# Patient Record
Sex: MALE | Race: WHITE | NOT HISPANIC OR LATINO | Employment: UNEMPLOYED | URBAN - METROPOLITAN AREA
[De-identification: names, ages, dates, MRNs, and addresses within clinical notes are randomized per-mention and may not be internally consistent; named-entity substitution may affect disease eponyms.]

---

## 2018-02-05 ENCOUNTER — TRANSFERRED RECORDS (OUTPATIENT)
Dept: HEALTH INFORMATION MANAGEMENT | Facility: CLINIC | Age: 23
End: 2018-02-05

## 2018-02-07 ENCOUNTER — MEDICAL CORRESPONDENCE (OUTPATIENT)
Dept: HEALTH INFORMATION MANAGEMENT | Facility: CLINIC | Age: 23
End: 2018-02-07

## 2018-02-07 ENCOUNTER — TRANSFERRED RECORDS (OUTPATIENT)
Dept: HEALTH INFORMATION MANAGEMENT | Facility: CLINIC | Age: 23
End: 2018-02-07

## 2018-03-01 ENCOUNTER — PRE VISIT (OUTPATIENT)
Dept: UROLOGY | Facility: CLINIC | Age: 23
End: 2018-03-01

## 2018-03-01 NOTE — TELEPHONE ENCOUNTER
MEDICAL RECORDS REQUEST   [facility]  Urology Clinic  [address] [place] [address]  PHONE: [phone]  Fax: [phone]        FUTURE VISIT INFORMATION                                                   Lucas Blank, : 1995 scheduled for future visit at [facility]    APPOINTMENT INFORMATION:    Date: 2018    Provider:  RIRI GALEANA    Reason for Visit/Diagnosis: HEMATURIA    REFERRAL INFORMATION:    Referring provider:  OMAR WESTFALL    Specialty: PCP    Referring providers clinic:  [facility] contact number:  [phone]    RECORDS REQUESTED FOR VISIT                                                     NOTES  STATUS/DETAILS   OFFICE NOTE from referring provider  yes   OFFICE NOTE from other specialist  yes   DISCHARGE SUMMARY from hospital  no   DISCHARGE REPORT from the ER  no   OPERATIVE REPORT  no   MEDICATION LIST  yes   LABS     URINALYSIS (UA)  yes   URINE CYTOLOGY  no   DIAGNOSIS SPECIFIC LABS     BLOOD IN URINE  REQUEST SENT TO [facility]  RECORDS IN EPIC  yes          PRE-VISIT CHECKLIST      Record collection complete Yes   Appointment appropriately scheduled           (right time/right provider) Yes   Joint diagnostic appointment coordinated correctly          (ensure right order & amount of time) Yes   MyChart activation If no, please explain IN PROCESS   Questionnaire complete If no, please explain IN PROCESS     Completed by: Dagmar Houston

## 2018-03-06 ENCOUNTER — PRE VISIT (OUTPATIENT)
Dept: UROLOGY | Facility: CLINIC | Age: 23
End: 2018-03-06

## 2018-03-06 NOTE — TELEPHONE ENCOUNTER
New patient consult for hematuria.  Records from [facility] scanned into [facility].

## 2018-03-07 ENCOUNTER — OFFICE VISIT (OUTPATIENT)
Dept: UROLOGY | Facility: CLINIC | Age: 23
End: 2018-03-07
Payer: COMMERCIAL

## 2018-03-07 VITALS
HEIGHT: 69 IN | SYSTOLIC BLOOD PRESSURE: 112 MMHG | BODY MASS INDEX: 22.96 KG/M2 | WEIGHT: 155 LBS | HEART RATE: 54 BPM | DIASTOLIC BLOOD PRESSURE: 63 MMHG

## 2018-03-07 DIAGNOSIS — R31.29 MICROSCOPIC HEMATURIA: Primary | ICD-10-CM

## 2018-03-07 LAB
ALBUMIN UR-MCNC: NEGATIVE MG/DL
APPEARANCE UR: CLEAR
BILIRUB UR QL STRIP: NEGATIVE
COLOR UR AUTO: YELLOW
GLUCOSE UR STRIP-MCNC: NEGATIVE MG/DL
HGB UR QL STRIP: ABNORMAL
KETONES UR STRIP-MCNC: NEGATIVE MG/DL
LEUKOCYTE ESTERASE UR QL STRIP: NEGATIVE
MUCOUS THREADS #/AREA URNS LPF: PRESENT /LPF
NITRATE UR QL: NEGATIVE
PH UR STRIP: 6 PH (ref 5–7)
RBC #/AREA URNS AUTO: 4 /HPF (ref 0–2)
SOURCE: ABNORMAL
SP GR UR STRIP: 1.02 (ref 1–1.03)
UROBILINOGEN UR STRIP-MCNC: 0 MG/DL (ref 0–2)
WBC #/AREA URNS AUTO: <1 /HPF (ref 0–5)

## 2018-03-07 ASSESSMENT — ENCOUNTER SYMPTOMS
SINUS PAIN: 1
CHILLS: 1
BLOOD IN STOOL: 0
HYPERTENSION: 0
WEIGHT LOSS: 0
HEMOPTYSIS: 0
DECREASED CONCENTRATION: 0
HYPOTENSION: 0
HEMATURIA: 1
SYNCOPE: 0
POLYPHAGIA: 0
WHEEZING: 0
MEMORY LOSS: 0
DIARRHEA: 1
HEADACHES: 1
TINGLING: 0
SHORTNESS OF BREATH: 1
SINUS CONGESTION: 1
HOARSE VOICE: 1
NECK MASS: 0
COUGH DISTURBING SLEEP: 1
LEG PAIN: 0
NAUSEA: 1
JAUNDICE: 0
POLYDIPSIA: 0
DYSPNEA ON EXERTION: 0
MUSCLE WEAKNESS: 0
BACK PAIN: 1
NERVOUS/ANXIOUS: 1
NUMBNESS: 0
TROUBLE SWALLOWING: 1
BLOATING: 1
CONSTIPATION: 0
ABDOMINAL PAIN: 1
JOINT SWELLING: 0
SORE THROAT: 1
TREMORS: 0
MYALGIAS: 1
ORTHOPNEA: 1
RECTAL PAIN: 0
DEPRESSION: 0
FATIGUE: 1
WEAKNESS: 0
PALPITATIONS: 0
ALTERED TEMPERATURE REGULATION: 0
LOSS OF CONSCIOUSNESS: 0
POSTURAL DYSPNEA: 1
NIGHT SWEATS: 0
SEIZURES: 0
SNORES LOUDLY: 0
BOWEL INCONTINENCE: 0
COUGH: 1
HEARTBURN: 1
STIFFNESS: 0
ARTHRALGIAS: 0
SPUTUM PRODUCTION: 0
FLANK PAIN: 1
VOMITING: 0
SMELL DISTURBANCE: 0
DISTURBANCES IN COORDINATION: 0
MUSCLE CRAMPS: 1
PARALYSIS: 0
DIZZINESS: 0
TASTE DISTURBANCE: 0
WEIGHT GAIN: 0
DIFFICULTY URINATING: 0
EXERCISE INTOLERANCE: 0
SPEECH CHANGE: 0
INCREASED ENERGY: 1
LIGHT-HEADEDNESS: 1
INSOMNIA: 0
DYSURIA: 0
HALLUCINATIONS: 0
DECREASED APPETITE: 1
PANIC: 0
SLEEP DISTURBANCES DUE TO BREATHING: 0
NECK PAIN: 1
FEVER: 1

## 2018-03-07 ASSESSMENT — PAIN SCALES - GENERAL: PAINLEVEL: NO PAIN (0)

## 2018-03-07 NOTE — LETTER
"3/7/2018       RE: Lucas Blank  [address] [facility] [address]     Dear Colleague,    Thank you for referring your patient, Lucas Blank, to the [facility] UROLOGY AND INST FOR PROSTATE AND UROLOGIC CANCERS at [facility]. Please see a copy of my visit note below.    It was my pleasure to meet Mr. Lucas Blank, a 22 year old year old male seen in consultation today at the request of MD Jose Liao for chief complaint: Consult (New patient consult for hematuria)    HPI: Mr. Lucas Blank is a healthy 22M seeking a job as a  who recently underwent a DOT physical revealing microhematuria. He was given the license from the DOT but he was told to follow up for the hematuria.   No gross hematuria  No stones/ UTIs/ STDs  No problems urinating  His dad has blood in his urine - this was evaluated and he was diagnosed with non-specific urethritis.    Has a midterm today at 6pm.     REVIEW OF DIAGNOSTICS:  2/5/18 - UA - dipstick trace intact blood, RBCs 0-2, otherwise negative  2/7/18- GC/CT negative  2/7/18 - UCx no growth    History reviewed. No pertinent past medical history.    History reviewed. No pertinent surgical history.    FAMILY HISTORY: Denies family history of urologic cancer.   Grandma - breast cancer  Grandpa - lung cancer  Uncle paternal - prostate cancer    SOCIAL HISTORY: Student - Engineering student   reports that he has been smoking.  He uses smokeless tobacco.    No current outpatient prescriptions on file.       ALLERGIES: Review of patient's allergies indicates no known allergies.      REVIEW OF SYSTEMS:    GENERAL PHYSICAL EXAM:   Vitals: /63  Pulse 54  Ht 1.746 m (5' 8.75\")  Wt 70.3 kg (155 lb)  BMI 23.06 kg/m2  Body mass index is 23.06 kg/(m^2).    GENERAL: Well groomed, well developed, well nourished male in NAD.  MS: Gait normal, normal muscle tone  SKIN: Warm to touch, dry.  No visible rashes or lesions on examined " areas.  HEMATOLOGIC/LYMPHATIC/IMMUNOLOGIC: normal inguinal nodes. No LE edema.  NEURO: Alert and oriented x 3.  PSYCH: Normal mood and affect, pleasant and agreeable during interview and exam.     :      Inguinal: no hernias       Circumcised penis, no penile plaques or lesions. Orthotopic location of the urethral meatus.       Scrotum normal.      Testicles of normal firmness and consistency, no masses.      Epididymes bilaterally without masses or tenderness.       ANDIE:      Deferred    RADIOLOGY: The following tests were reviewed: None    LABS: The last test results for Ms. Lucas Blank were reviewed.   PSA - No results found for: PSA  BMP - No lab results found.    CBC - No lab results found.    ASSESSMENT:   1) Dipstick hematuria - no RBCs    PLAN:   - UA with micro reflex today (to confirm no hematuria)  - UA in 1 week or so (again to confirm no hematuria)   - If these are negative then no further workup required      Again, thank you for allowing me to participate in the care of your patient.      Sincerely,    MILTON Langston

## 2018-03-07 NOTE — MR AVS SNAPSHOT
"              After Visit Summary   3/7/2018    Lucas Blank    MRN: [id number]           Patient Information     Date Of Birth          1995        Visit Information        Provider Department      3/7/2018 4:30 PM Mirtha Brunner PA [facility] Urology and [facility] for Prostate and Urologic Cancers        Care Instructions    Will send urine today   Please submit another urine sample over the next couple weeks at the [facility] (this [facility]) on the first floor.     Yancy Brunner          Follow-ups after your visit        Who to contact     Please call your clinic at [phone] to:    Ask questions about your health    Make or cancel appointments    Discuss your medicines    Learn about your test results    Speak to your doctor            Additional Information About Your Visit        MyChart Information     [employer] is an electronic gateway that provides easy, online access to your medical records. With [employer], you can request a clinic appointment, read your test results, renew a prescription or communicate with your care team.     To sign up for [email or web address] visit the website at [email or web address]   You will be asked to enter the access code listed below, as well as some personal information. Please follow the directions to create your username and password.     Your access code is: [email or web address]  Expires: 2018  6:30 AM     Your access code will  in 90 days. If you need help or a new code, please contact your [facility] or call [phone] for assistance.        Care EveryWhere ID     This is your Care EveryWhere ID. This could be used by other organizations to access your [facility] medical records  [id number]        Your Vitals Were     Pulse Height BMI (Body Mass Index)             54 1.746 m (5' 8.75\") 23.06 kg/m2          Blood Pressure from Last 3 Encounters:   18 112/63    Weight from Last 3 Encounters:   18 70.3 kg (155 lb)              Today, you " had the following     No orders found for display       Primary Care Provider Fax #    [facility] [phone]       [address] [place] [id number]        Equal Access to Services     KISHA COATES : Fabián Borden, debra aquino, scott beaulieumafrancisca cruz, savannah stahl angelcecilia sheldonirajramesh chauhan. So [facility] [phone].    ATENCIÓN: Si habla español, tiene a renner disposición servicios gratuitos de asistencia lingüística. Llame al [phone].    We comply with applicable federal civil rights laws and [place] laws. We do not discriminate on the basis of race, color, national origin, age, disability, sex, sexual orientation, or gender identity.            Thank you!     Thank you for choosing [facility] UROLOGY AND [facility] FOR PROSTATE AND UROLOGIC CANCERS  for your care. Our goal is always to provide you with excellent care. Hearing back from our patients is one way we can continue to improve our services. Please take a few minutes to complete the written survey that you may receive in the mail after your visit with us. Thank you!             Your Updated Medication List - Protect others around you: Learn how to safely use, store and throw away your medicines at www.disposemymeds.org.      Notice  As of 3/7/2018  5:17 PM    You have not been prescribed any medications.

## 2018-03-07 NOTE — LETTER
Lucas Blank   [address] [facility] [address]        Dear Lucas:     I am writing you concerning your recent test results:    Results for orders placed or performed in visit on 03/07/18   UA with Microscopic reflex to Culture   Result Value Ref Range    Color Urine Yellow     Appearance Urine Clear     Glucose Urine Negative NEG^Negative mg/dL    Bilirubin Urine Negative NEG^Negative    Ketones Urine Negative NEG^Negative mg/dL    Specific Gravity Urine 1.021 1.003 - 1.035    Blood Urine Small (A) NEG^Negative    pH Urine 6.0 5.0 - 7.0 pH    Protein Albumin Urine Negative NEG^Negative mg/dL    Urobilinogen mg/dL 0.0 0.0 - 2.0 mg/dL    Nitrite Urine Negative NEG^Negative    Leukocyte Esterase Urine Negative NEG^Negative    Source Midstream Urine     WBC Urine <1 0 - 5 /HPF    RBC Urine 4 (H) 0 - 2 /HPF    Mucous Urine Present (A) NEG^Negative /LPF         Your most recent urinalysis does show a slightly higher than acceptable number of red blood cells (RBCs) under the microscope.  But given your young age, the abscess of symptoms (no urinary problems, pain, visible blood in the urine, history of stones or other medical problems), the American Urological Association recommends no invasive testing for you. I do recommend that you get an ultrasound of your kidneys to assure they look normal.  I will ask our clinic staff to contact you to set up this appointment, then I will contact you with the results.      Once per year you should have urinalysis checked with your primary care physician.     You should return immediately to urology if you see visible blood in the urine or if you notice any new urinary symptoms.           I would be happy to see you back in clinic to discuss results in depth.  Please let me know if you have any questions.      Sincerely,          Mirtha Brunner PA-C  Physician Assistant Urology        [facility] UROLOGY AND [facility] FOR PROSTATE AND UROLOGIC CANCERS  [address]  [facility] [address]  Phone: [phone]  Fax: [phone]

## 2018-03-07 NOTE — NURSING NOTE
"Chief Complaint   Patient presents with     Consult     New patient consult for hematuria       Initial Ht 1.746 m (5' 8.75\")  Wt 70.3 kg (155 lb)  BMI 23.06 kg/m2 Estimated body mass index is 23.06 kg/(m^2) as calculated from the following:    Height as of this encounter: 1.746 m (5' 8.75\").    Weight as of this encounter: 70.3 kg (155 lb).  Medication Reconciliation: complete     EROS Zavaleta    "

## 2018-03-07 NOTE — PROGRESS NOTES
It was my pleasure to meet Mr. Lucas Blank, a 22 year old year old male seen in consultation today at the request of MD Jose Liao for chief complaint: Consult (New patient consult for hematuria)    HPI: Mr. Lucas Blank is a healthy 22M seeking a job as a  who recently underwent a DOT physical revealing microhematuria. He was given the license from the DOT but he was told to follow up for the hematuria.   No gross hematuria  No stones/ UTIs/ STDs  No problems urinating  His dad has blood in his urine - this was evaluated and he was diagnosed with non-specific urethritis.    Has a midterm today at 6pm.     REVIEW OF DIAGNOSTICS:  2/5/18 - UA - dipstick trace intact blood, RBCs 0-2, otherwise negative  2/7/18- GC/CT negative  2/7/18 - UCx no growth    History reviewed. No pertinent past medical history.    History reviewed. No pertinent surgical history.    FAMILY HISTORY: Denies family history of urologic cancer.   Grandma - breast cancer  Grandpa - lung cancer  Uncle paternal - prostate cancer    SOCIAL HISTORY: Student - Engineering student   reports that he has been smoking.  He uses smokeless tobacco.    No current outpatient prescriptions on file.       ALLERGIES: Review of patient's allergies indicates no known allergies.      REVIEW OF SYSTEMS:  Answers for HPI/ROS submitted by the patient on 3/7/2018   General Symptoms: Yes  Skin Symptoms: No  HENT Symptoms: Yes  EYE SYMPTOMS: No  HEART SYMPTOMS: Yes  LUNG SYMPTOMS: Yes  INTESTINAL SYMPTOMS: Yes  URINARY SYMPTOMS: Yes  REPRODUCTIVE SYMPTOMS: No  SKELETAL SYMPTOMS: Yes  BLOOD SYMPTOMS: No  NERVOUS SYSTEM SYMPTOMS: Yes  MENTAL HEALTH SYMPTOMS: Yes  Fever: Yes  Loss of appetite: Yes  Weight loss: No  Weight gain: No  Fatigue: Yes  Night sweats: No  Chills: Yes  Increased stress: No  Excessive hunger: No  Excessive thirst: No  Feeling hot or cold when others believe the temperature is normal: No  Loss of height: No  Post-operative  complications: No  Surgical site pain: No  Hallucinations: No  Change in or Loss of Energy: Yes  Hyperactivity: No  Confusion: No  Ear pain: No  Ear discharge: No  Hearing loss: No  Tinnitus: No  Nosebleeds: No  Congestion: Yes  Sinus pain: Yes  Trouble swallowing: Yes   Voice hoarseness: Yes  Mouth sores: No  Sore throat: Yes  Tooth pain: No  Gum tenderness: No  Bleeding gums: No  Change in taste: No  Change in sense of smell: No  Dry mouth: Yes  Hearing aid used: No  Neck lump: No  Cough: Yes  Sputum or phlegm: No  Coughing up blood: No  Difficulty breating or shortness of breath: Yes  Snoring: No  Wheezing: No  Difficulty breathing on exertion: No  Nighttime Cough: Yes  Difficulty breathing when lying flat: Yes  Chest pain or pressure: Yes  Fast or irregular heartbeat: No  Pain in legs with walking: No  Trouble breathing while lying down: Yes  Fingers or toes appear blue: No  High blood pressure: No  Low blood pressure: No  Fainting: No  Murmurs: No  Pacemaker: No  Varicose veins: No  Edema or swelling: No  Wake up at night with shortness of breath: No  Light-headedness: Yes  Exercise intolerance: No  Heart burn or indigestion: Yes  Nausea: Yes  Vomiting: No  Abdominal pain: Yes  Bloating: Yes  Constipation: No  Diarrhea: Yes  Blood in stool: No  Black stools: No  Rectal or Anal pain: No  Fecal incontinence: No  Yellowing of skin or eyes: No  Vomit with blood: No  Change in stools: No  Trouble holding urine or incontinence: No  Pain or burning: No  Trouble starting or stopping: No  Increased frequency of urination: No  Blood in urine: Yes  Decreased frequency of urination: No  Frequent nighttime urination: No  Flank pain: Yes  Difficulty emptying bladder: No  Back pain: Yes  Muscle aches: Yes  Neck pain: Yes  Swollen joints: No  Joint pain: No  Bone pain: No  Muscle cramps: Yes  Muscle weakness: No  Joint stiffness: No  Bone fracture: No  Trouble with coordination: No  Dizziness or trouble with balance:  "No  Fainting or black-out spells: No  Memory loss: No  Headache: Yes  Seizures: No  Speech problems: No  Tingling: No  Tremor: No  Weakness: No  Difficulty walking: No  Paralysis: No  Numbness: No  Nervous or Anxious: Yes  Depression: No  Trouble sleeping: No  Trouble thinking or concentrating: No  Mood changes: No  Panic attacks: No    GENERAL PHYSICAL EXAM:   Vitals: /63  Pulse 54  Ht 1.746 m (5' 8.75\")  Wt 70.3 kg (155 lb)  BMI 23.06 kg/m2  Body mass index is 23.06 kg/(m^2).    GENERAL: Well groomed, well developed, well nourished male in NAD.  MS: Gait normal, normal muscle tone  SKIN: Warm to touch, dry.  No visible rashes or lesions on examined areas.  HEMATOLOGIC/LYMPHATIC/IMMUNOLOGIC: normal inguinal nodes. No LE edema.  NEURO: Alert and oriented x 3.  PSYCH: Normal mood and affect, pleasant and agreeable during interview and exam.     :      Inguinal: no hernias       Circumcised penis, no penile plaques or lesions. Orthotopic location of the urethral meatus.       Scrotum normal.      Testicles of normal firmness and consistency, no masses.      Epididymes bilaterally without masses or tenderness.       ANDIE:      Deferred    RADIOLOGY: The following tests were reviewed: None    LABS: The last test results for Ms. Lucas Blank were reviewed.   PSA - No results found for: PSA  BMP - No lab results found.    CBC - No lab results found.    ASSESSMENT:   1) Dipstick hematuria - no RBCs    PLAN:   - UA with micro reflex today (to confirm no hematuria)  - UA in 1 week or so (again to confirm no hematuria)   - If these are negative then no further workup required    Yancy Brunner PA-C  Department of Urologic Surgery    "

## 2018-03-07 NOTE — PATIENT INSTRUCTIONS
Will send urine today   Please submit another urine sample over the next couple weeks at the [facility] on the first floor.     Yancy Brunner

## 2018-03-20 DIAGNOSIS — R31.29 MICROHEMATURIA: Primary | ICD-10-CM

## 2018-03-21 ENCOUNTER — RADIANT APPOINTMENT (OUTPATIENT)
Dept: ULTRASOUND IMAGING | Facility: CLINIC | Age: 23
End: 2018-03-21
Attending: PHYSICIAN ASSISTANT
Payer: COMMERCIAL

## 2018-03-21 DIAGNOSIS — R31.29 MICROHEMATURIA: ICD-10-CM

## 2018-04-26 ENCOUNTER — RADIANT APPOINTMENT (OUTPATIENT)
Dept: CT IMAGING | Facility: CLINIC | Age: 23
End: 2018-04-26
Attending: INTERNAL MEDICINE
Payer: COMMERCIAL

## 2018-04-26 DIAGNOSIS — R10.31 RIGHT LOWER QUADRANT ABDOMINAL PAIN: ICD-10-CM

## 2018-04-26 RX ORDER — IOPAMIDOL 755 MG/ML
95 INJECTION, SOLUTION INTRAVASCULAR ONCE
Status: COMPLETED | OUTPATIENT
Start: 2018-04-26 | End: 2018-04-26

## 2018-04-26 RX ADMIN — IOPAMIDOL 95 ML: 755 INJECTION, SOLUTION INTRAVASCULAR at 15:18

## 2018-04-26 NOTE — DISCHARGE INSTRUCTIONS

## 2018-05-04 ENCOUNTER — RADIANT APPOINTMENT (OUTPATIENT)
Dept: ULTRASOUND IMAGING | Facility: CLINIC | Age: 23
End: 2018-05-04
Payer: COMMERCIAL

## 2018-05-04 DIAGNOSIS — R10.33 PERIUMBILICAL ABDOMINAL PAIN: ICD-10-CM

## 2018-05-04 DIAGNOSIS — K21.9 GASTROESOPHAGEAL REFLUX DISEASE, ESOPHAGITIS PRESENCE NOT SPECIFIED: ICD-10-CM

## 2019-01-30 ENCOUNTER — APPOINTMENT (OUTPATIENT)
Dept: GENERAL RADIOLOGY | Facility: CLINIC | Age: 24
End: 2019-01-30
Payer: COMMERCIAL

## 2019-01-30 ENCOUNTER — HOSPITAL ENCOUNTER (EMERGENCY)
Facility: CLINIC | Age: 24
Discharge: HOME | End: 2019-01-30
Attending: EMERGENCY MEDICINE | Admitting: EMERGENCY MEDICINE
Payer: COMMERCIAL

## 2019-01-30 VITALS
RESPIRATION RATE: 14 BRPM | HEIGHT: 68 IN | SYSTOLIC BLOOD PRESSURE: 112 MMHG | DIASTOLIC BLOOD PRESSURE: 76 MMHG | OXYGEN SATURATION: 97 % | HEART RATE: 53 BPM | BODY MASS INDEX: 24.14 KG/M2 | TEMPERATURE: 98.1 F | WEIGHT: 159.3 LBS

## 2019-01-30 DIAGNOSIS — M54.9 UPPER BACK PAIN ON LEFT SIDE: ICD-10-CM

## 2019-01-30 LAB
ALBUMIN UR-MCNC: NEGATIVE MG/DL
ANION GAP SERPL CALCULATED.3IONS-SCNC: 6 MMOL/L (ref 3–14)
APPEARANCE UR: CLEAR
BASOPHILS # BLD AUTO: 0.1 10E9/L (ref 0–0.2)
BASOPHILS NFR BLD AUTO: 1.2 %
BILIRUB UR QL STRIP: NEGATIVE
BUN SERPL-MCNC: 15 MG/DL (ref 7–30)
CALCIUM SERPL-MCNC: 8.9 MG/DL (ref 8.5–10.1)
CHLORIDE SERPL-SCNC: 106 MMOL/L (ref 94–109)
CO2 SERPL-SCNC: 28 MMOL/L (ref 20–32)
COLOR UR AUTO: YELLOW
CREAT SERPL-MCNC: 0.84 MG/DL (ref 0.66–1.25)
CRP SERPL-MCNC: <2.9 MG/L (ref 0–8)
D DIMER PPP FEU-MCNC: <0.3 UG/ML FEU (ref 0–0.5)
EOSINOPHIL # BLD AUTO: 0.3 10E9/L (ref 0–0.7)
EOSINOPHIL NFR BLD AUTO: 4.8 %
ERYTHROCYTE [DISTWIDTH] IN BLOOD BY AUTOMATED COUNT: 12.6 % (ref 10–15)
GFR SERPL CREATININE-BSD FRML MDRD: >90 ML/MIN/{1.73_M2}
GLUCOSE SERPL-MCNC: 88 MG/DL (ref 70–99)
GLUCOSE UR STRIP-MCNC: NEGATIVE MG/DL
HCT VFR BLD AUTO: 45.9 % (ref 40–53)
HGB BLD-MCNC: 15.8 G/DL (ref 13.3–17.7)
HGB UR QL STRIP: ABNORMAL
IMM GRANULOCYTES # BLD: 0 10E9/L (ref 0–0.4)
IMM GRANULOCYTES NFR BLD: 0.4 %
KETONES UR STRIP-MCNC: NEGATIVE MG/DL
LEUKOCYTE ESTERASE UR QL STRIP: NEGATIVE
LYMPHOCYTES # BLD AUTO: 1.7 10E9/L (ref 0.8–5.3)
LYMPHOCYTES NFR BLD AUTO: 32.6 %
MCH RBC QN AUTO: 31.3 PG (ref 26.5–33)
MCHC RBC AUTO-ENTMCNC: 34.4 G/DL (ref 31.5–36.5)
MCV RBC AUTO: 91 FL (ref 78–100)
MONOCYTES # BLD AUTO: 0.6 10E9/L (ref 0–1.3)
MONOCYTES NFR BLD AUTO: 11.2 %
MUCOUS THREADS #/AREA URNS LPF: PRESENT /LPF
NEUTROPHILS # BLD AUTO: 2.6 10E9/L (ref 1.6–8.3)
NEUTROPHILS NFR BLD AUTO: 49.8 %
NITRATE UR QL: NEGATIVE
NRBC # BLD AUTO: 0 10*3/UL
NRBC BLD AUTO-RTO: 0 /100
PH UR STRIP: 5.5 PH (ref 5–7)
PLATELET # BLD AUTO: 310 10E9/L (ref 150–450)
POTASSIUM SERPL-SCNC: 4 MMOL/L (ref 3.4–5.3)
RBC # BLD AUTO: 5.04 10E12/L (ref 4.4–5.9)
RBC #/AREA URNS AUTO: 3 /HPF (ref 0–2)
SODIUM SERPL-SCNC: 140 MMOL/L (ref 133–144)
SOURCE: ABNORMAL
SP GR UR STRIP: 1.02 (ref 1–1.03)
SQUAMOUS #/AREA URNS AUTO: <1 /HPF (ref 0–1)
TRANS CELLS #/AREA URNS HPF: <1 /HPF (ref 0–1)
UROBILINOGEN UR STRIP-MCNC: NORMAL MG/DL (ref 0–2)
WBC # BLD AUTO: 5.2 10E9/L (ref 4–11)
WBC #/AREA URNS AUTO: 1 /HPF (ref 0–5)

## 2019-01-30 PROCEDURE — 81001 URINALYSIS AUTO W/SCOPE: CPT | Performed by: EMERGENCY MEDICINE

## 2019-01-30 PROCEDURE — 96374 THER/PROPH/DIAG INJ IV PUSH: CPT | Performed by: EMERGENCY MEDICINE

## 2019-01-30 PROCEDURE — 85025 COMPLETE CBC W/AUTO DIFF WBC: CPT | Performed by: EMERGENCY MEDICINE

## 2019-01-30 PROCEDURE — 99284 EMERGENCY DEPT VISIT MOD MDM: CPT | Mod: 25 | Performed by: EMERGENCY MEDICINE

## 2019-01-30 PROCEDURE — 86140 C-REACTIVE PROTEIN: CPT | Performed by: EMERGENCY MEDICINE

## 2019-01-30 PROCEDURE — 25000128 H RX IP 250 OP 636: Performed by: EMERGENCY MEDICINE

## 2019-01-30 PROCEDURE — 80048 BASIC METABOLIC PNL TOTAL CA: CPT | Performed by: EMERGENCY MEDICINE

## 2019-01-30 PROCEDURE — 96361 HYDRATE IV INFUSION ADD-ON: CPT | Performed by: EMERGENCY MEDICINE

## 2019-01-30 PROCEDURE — 85379 FIBRIN DEGRADATION QUANT: CPT | Performed by: EMERGENCY MEDICINE

## 2019-01-30 PROCEDURE — 71046 X-RAY EXAM CHEST 2 VIEWS: CPT

## 2019-01-30 PROCEDURE — 99284 EMERGENCY DEPT VISIT MOD MDM: CPT | Mod: Z6 | Performed by: EMERGENCY MEDICINE

## 2019-01-30 RX ORDER — CYCLOBENZAPRINE HCL 10 MG
10 TABLET ORAL 3 TIMES DAILY PRN
Qty: 20 TABLET | Refills: 0 | Status: SHIPPED | OUTPATIENT
Start: 2019-01-30 | End: 2019-02-05

## 2019-01-30 RX ORDER — SODIUM CHLORIDE 9 MG/ML
1000 INJECTION, SOLUTION INTRAVENOUS CONTINUOUS
Status: DISCONTINUED | OUTPATIENT
Start: 2019-01-30 | End: 2019-01-30 | Stop reason: HOSPADM

## 2019-01-30 RX ORDER — KETOROLAC TROMETHAMINE 30 MG/ML
30 INJECTION, SOLUTION INTRAMUSCULAR; INTRAVENOUS ONCE
Status: COMPLETED | OUTPATIENT
Start: 2019-01-30 | End: 2019-01-30

## 2019-01-30 RX ORDER — IBUPROFEN 600 MG/1
600 TABLET, FILM COATED ORAL EVERY 6 HOURS PRN
Qty: 20 TABLET | Refills: 0 | Status: SHIPPED | OUTPATIENT
Start: 2019-01-30 | End: 2019-03-01

## 2019-01-30 RX ADMIN — KETOROLAC TROMETHAMINE 30 MG: 30 INJECTION, SOLUTION INTRAMUSCULAR at 08:43

## 2019-01-30 RX ADMIN — SODIUM CHLORIDE 1000 ML: 9 INJECTION, SOLUTION INTRAVENOUS at 08:22

## 2019-01-30 ASSESSMENT — ENCOUNTER SYMPTOMS
ARTHRALGIAS: 0
BACK PAIN: 1
CONFUSION: 0
NECK STIFFNESS: 0
DIFFICULTY URINATING: 0
EYE REDNESS: 0
HEADACHES: 0
ABDOMINAL PAIN: 0
FEVER: 0
COLOR CHANGE: 0
SHORTNESS OF BREATH: 0

## 2019-01-30 ASSESSMENT — MIFFLIN-ST. JEOR: SCORE: 1702.08

## 2019-01-30 NOTE — ED AVS SNAPSHOT
[facility] [place], Emergency Department  [address] [place] [address]  Phone:  [phone]                                    Lucas Blank   MRN: [id number]    Department:  [facility], Emergency Department   Date of Visit:  1/30/2019           After Visit Summary Signature Page    I have received my discharge instructions, and my questions have been answered. I have discussed any challenges I see with this plan with the nurse or doctor.    ..........................................................................................................................................  Patient/Patient Representative Signature      ..........................................................................................................................................  Patient Representative Print Name and Relationship to Patient    ..................................................               ................................................  Date                                   Time    ..........................................................................................................................................  Reviewed by Signature/Title    ...................................................              ..............................................  Date                                               Time          22EPIC Rev 08/18

## 2019-01-30 NOTE — ED PROVIDER NOTES
History     Chief Complaint   Patient presents with     Flank Pain     left     Back Pain     HPI  Lucas Blank is a 23 year old male who comes for evaluation of left mid back pain.  Patient states that he was in his usual state of health when he had 12 hours ago spontaneous onset of sharp, knifelike and stabbing back pain.  The pain was nonradiating and was made worse with certain positions and movements (such as bending and extending) as well as deep breaths.  There were no improving factors and he did not try anything to alleviate the pain.  Patient states that he had somewhat similar pain approximately 2 weeks ago with URI type symptoms that was bilateral.  He had a number of tests including chest x-ray which were unremarkable.  This pain resolved spontaneously.  He denies recent injury or other ongoing symptoms including nausea, vomiting, diarrhea, fever, chills, sweats cough, shortness of breath, runny nose or sore throat.  Patient notes that his paternal grandmother has a history of DVT and pulmonary embolus, he also notes that he sat in a car for more than 12 hours approximately 1 month ago.  He otherwise denies symptoms of DVT or pulmonary embolus or other risk factors including smoking, leg swelling.  I have reviewed the Medications, Allergies, Past Medical and Surgical History, and Social History in the Epic system.    Review of Systems   Constitutional: Negative for fever.   HENT: Negative for congestion.    Eyes: Negative for redness.   Respiratory: Negative for shortness of breath.    Cardiovascular: Negative for chest pain.   Gastrointestinal: Negative for abdominal pain.   Genitourinary: Negative for difficulty urinating.   Musculoskeletal: Positive for back pain. Negative for arthralgias and neck stiffness.   Skin: Negative for color change.   Neurological: Negative for headaches.   Psychiatric/Behavioral: Negative for confusion.       Physical Exam   BP: 134/75  Pulse: 72  Temp: 98.1  F (36.7  " C)  Resp: 14  Height: 172.7 cm (5' 8\")  Weight: 72.3 kg (159 lb 4.8 oz)  SpO2: 98 %      Physical Exam   Constitutional: No distress.   HENT:   Head: Atraumatic.   Mouth/Throat: Oropharynx is clear and moist.   Eyes: Pupils are equal, round, and reactive to light. No scleral icterus.   Cardiovascular: Normal heart sounds and intact distal pulses.   Pulmonary/Chest: Breath sounds normal. No respiratory distress.   Abdominal: Soft. Bowel sounds are normal. There is no tenderness.   Musculoskeletal: He exhibits no edema or tenderness.        Thoracic back: He exhibits pain. He exhibits normal range of motion, no tenderness, no bony tenderness, no swelling, no edema and no deformity.        Back:    Skin: Skin is warm. No rash noted. He is not diaphoretic.       ED Course        Procedures           Results for orders placed or performed during the hospital encounter of 01/30/19   XR Chest 2 Views    Impression    IMPRESSION: No acute cardiac or pulmonary abnormalities.   CBC with platelets differential   Result Value Ref Range    WBC 5.2 4.0 - 11.0 10e9/L    RBC Count 5.04 4.4 - 5.9 10e12/L    Hemoglobin 15.8 13.3 - 17.7 g/dL    Hematocrit 45.9 40.0 - 53.0 %    MCV 91 78 - 100 fl    MCH 31.3 26.5 - 33.0 pg    MCHC 34.4 31.5 - 36.5 g/dL    RDW 12.6 10.0 - 15.0 %    Platelet Count 310 150 - 450 10e9/L    % Neutrophils 49.8 %    % Lymphocytes 32.6 %    % Monocytes 11.2 %    % Eosinophils 4.8 %    % Basophils 1.2 %    % Immature Granulocytes 0.4 %    Nucleated RBCs 0 0 /100    Absolute Neutrophil 2.6 1.6 - 8.3 10e9/L    Absolute Lymphocytes 1.7 0.8 - 5.3 10e9/L    Absolute Monocytes 0.6 0.0 - 1.3 10e9/L    Absolute Eosinophils 0.3 0.0 - 0.7 10e9/L    Absolute Basophils 0.1 0.0 - 0.2 10e9/L    Abs Immature Granulocytes 0.0 0 - 0.4 10e9/L    Absolute Nucleated RBC 0.0    Basic metabolic panel   Result Value Ref Range    Sodium 140 133 - 144 mmol/L    Potassium 4.0 3.4 - 5.3 mmol/L    Chloride 106 94 - 109 mmol/L    Carbon " Dioxide 28 20 - 32 mmol/L    Anion Gap 6 3 - 14 mmol/L    Glucose 88 70 - 99 mg/dL    Urea Nitrogen 15 7 - 30 mg/dL    Creatinine 0.84 0.66 - 1.25 mg/dL    GFR Estimate >90 >60 mL/min/[1.73_m2]    GFR Estimate If Black >90 >60 mL/min/[1.73_m2]    Calcium 8.9 8.5 - 10.1 mg/dL   CRP inflammation   Result Value Ref Range    CRP Inflammation <2.9 0.0 - 8.0 mg/L   D dimer quantitative   Result Value Ref Range    D Dimer <0.3 0.0 - 0.50 ug/ml FEU   UA reflex to Microscopic and Culture   Result Value Ref Range    Color Urine Yellow     Appearance Urine Clear     Glucose Urine Negative NEG^Negative mg/dL    Bilirubin Urine Negative NEG^Negative    Ketones Urine Negative NEG^Negative mg/dL    Specific Gravity Urine 1.016 1.003 - 1.035    Blood Urine Small (A) NEG^Negative    pH Urine 5.5 5.0 - 7.0 pH    Protein Albumin Urine Negative NEG^Negative mg/dL    Urobilinogen mg/dL Normal 0.0 - 2.0 mg/dL    Nitrite Urine Negative NEG^Negative    Leukocyte Esterase Urine Negative NEG^Negative    Source Midstream Urine     RBC Urine 3 (H) 0 - 2 /HPF    WBC Urine 1 0 - 5 /HPF    Squamous Epithelial /HPF Urine <1 0 - 1 /HPF    Transitional Epi <1 0 - 1 /HPF    Mucous Urine Present (A) NEG^Negative /LPF         Labs Ordered and Resulted from Time of ED Arrival Up to the Time of Departure from the ED   UA MACROSCOPIC WITH REFLEX TO MICRO AND CULTURE - Abnormal; Notable for the following components:       Result Value    Blood Urine Small (*)     RBC Urine 3 (*)     Mucous Urine Present (*)     All other components within normal limits   CBC WITH PLATELETS DIFFERENTIAL   BASIC METABOLIC PANEL   CRP INFLAMMATION   D DIMER QUANTITATIVE            Assessments & Plan (with Medical Decision Making)   23-year-old male presents for evaluation of left mid back pain.  Initial exam revealed tenderness approximately 5 cm above costovertebral angle on the left side but was otherwise normal including vital signs.  Differential included but was not  limited to pulmonary embolus, pneumothorax, pneumonia, musculoskeletal pain, pleuritis, renal colic.  Laboratories were obtained including CBC, basic metabolic panel, CRP, d-dimer and urinalysis all of which were normal with the exception of a urinalysis revealing 3 red blood cells per high-power field which is down from urinalysis obtained at 10 months ago followed by normal abdomen/pelvis CT with exception of simple cyst.  Patient was treated in the emergency room with Toradol providing some relief.  Given unremarkable workup, I suspect signs and symptoms are musculoskeletal and less likely pleuritic in nature.  I recommended use of ibuprofen, Flexeril, ice alternating with heat and follow-up at [facility] in the next 7 days if symptoms have not completely resolved.    I have reviewed the nursing notes.    I have reviewed the findings, diagnosis, plan and need for follow up with the patient.       Medication List      Started    cyclobenzaprine 10 MG tablet  Commonly known as:  FLEXERIL  10 mg, Oral, 3 TIMES DAILY PRN     ibuprofen 600 MG tablet  Commonly known as:  ADVIL/MOTRIN  600 mg, Oral, EVERY 6 HOURS PRN            Final diagnoses:   Upper back pain on left side       1/30/2019   [facility], [place], EMERGENCY DEPARTMENT     Fredy Orantes MD  01/30/19 1014

## 2019-01-30 NOTE — ED TRIAGE NOTES
Pt presents ambulatory to triage with c/o left flank/back pain for the past 12 hours. Denies any changes with or blood in urine. Denies any trauma or injury to back.

## 2019-10-20 ENCOUNTER — HOSPITAL ENCOUNTER (EMERGENCY)
Facility: CLINIC | Age: 24
Discharge: HOME | End: 2019-10-20
Attending: EMERGENCY MEDICINE | Admitting: EMERGENCY MEDICINE
Payer: COMMERCIAL

## 2019-10-20 VITALS
SYSTOLIC BLOOD PRESSURE: 133 MMHG | TEMPERATURE: 98.1 F | RESPIRATION RATE: 16 BRPM | DIASTOLIC BLOOD PRESSURE: 54 MMHG | OXYGEN SATURATION: 98 %

## 2019-10-20 DIAGNOSIS — S61.216A LACERATION OF RIGHT LITTLE FINGER WITHOUT FOREIGN BODY WITHOUT DAMAGE TO NAIL, INITIAL ENCOUNTER: ICD-10-CM

## 2019-10-20 PROCEDURE — 25000132 ZZH RX MED GY IP 250 OP 250 PS 637: Performed by: EMERGENCY MEDICINE

## 2019-10-20 PROCEDURE — 90715 TDAP VACCINE 7 YRS/> IM: CPT | Performed by: EMERGENCY MEDICINE

## 2019-10-20 PROCEDURE — 25000125 ZZHC RX 250: Performed by: EMERGENCY MEDICINE

## 2019-10-20 PROCEDURE — 25000128 H RX IP 250 OP 636: Performed by: EMERGENCY MEDICINE

## 2019-10-20 PROCEDURE — 90471 IMMUNIZATION ADMIN: CPT | Performed by: EMERGENCY MEDICINE

## 2019-10-20 PROCEDURE — 12002 RPR S/N/AX/GEN/TRNK2.6-7.5CM: CPT | Mod: Z6 | Performed by: EMERGENCY MEDICINE

## 2019-10-20 PROCEDURE — 99284 EMERGENCY DEPT VISIT MOD MDM: CPT | Mod: 25 | Performed by: EMERGENCY MEDICINE

## 2019-10-20 PROCEDURE — 12002 RPR S/N/AX/GEN/TRNK2.6-7.5CM: CPT | Performed by: EMERGENCY MEDICINE

## 2019-10-20 PROCEDURE — 99283 EMERGENCY DEPT VISIT LOW MDM: CPT | Mod: 25 | Performed by: EMERGENCY MEDICINE

## 2019-10-20 RX ORDER — CEPHALEXIN 500 MG/1
500 CAPSULE ORAL 4 TIMES DAILY
Qty: 12 CAPSULE | Refills: 0 | Status: SHIPPED | OUTPATIENT
Start: 2019-10-20 | End: 2019-10-23

## 2019-10-20 RX ORDER — LIDOCAINE HYDROCHLORIDE AND EPINEPHRINE 10; 10 MG/ML; UG/ML
1 INJECTION, SOLUTION INFILTRATION; PERINEURAL ONCE
Status: COMPLETED | OUTPATIENT
Start: 2019-10-20 | End: 2019-10-20

## 2019-10-20 RX ORDER — CEPHALEXIN 500 MG/1
500 CAPSULE ORAL ONCE
Status: COMPLETED | OUTPATIENT
Start: 2019-10-20 | End: 2019-10-20

## 2019-10-20 RX ADMIN — LIDOCAINE HYDROCHLORIDE AND EPINEPHRINE 1 ML: 10; 10 INJECTION, SOLUTION INFILTRATION; PERINEURAL at 18:05

## 2019-10-20 RX ADMIN — CEPHALEXIN 500 MG: 500 CAPSULE ORAL at 18:04

## 2019-10-20 RX ADMIN — CLOSTRIDIUM TETANI TOXOID ANTIGEN (FORMALDEHYDE INACTIVATED), CORYNEBACTERIUM DIPHTHERIAE TOXOID ANTIGEN (FORMALDEHYDE INACTIVATED), BORDETELLA PERTUSSIS TOXOID ANTIGEN (GLUTARALDEHYDE INACTIVATED), BORDETELLA PERTUSSIS FILAMENTOUS HEMAGGLUTININ ANTIGEN (FORMALDEHYDE INACTIVATED), BORDETELLA PERTUSSIS PERTACTIN ANTIGEN, AND BORDETELLA PERTUSSIS FIMBRIAE 2/3 ANTIGEN 0.5 ML: 5; 2; 2.5; 5; 3; 5 INJECTION, SUSPENSION INTRAMUSCULAR at 18:04

## 2019-10-20 ASSESSMENT — ENCOUNTER SYMPTOMS
WOUND: 1
WEAKNESS: 0
NUMBNESS: 0
ARTHRALGIAS: 0

## 2019-10-20 NOTE — DISCHARGE INSTRUCTIONS
Please make an appointment to follow up with Your Primary Care Provider and [facility] (phone: [phone]) in 7-10 days for suture removal.    Take keflex to prevent wound infection.    Keep wound clean and dry. Do not submerge in water (bathtub, pool, sink) for the net 48 hours. Do not get laceration wet for the next 24 hours. After that, you may bathe and shower as usual. Apply bacitracin or Vaseline ointment to wound to help with healing. Check wound daily for signs of infection such as increased swelling, redness, drainage, pain, or odor.     Return to the emergency department if you have concerns for wound infection.     Follow up with a primary doctor or urgent care (or in this emergency department) in 7-10 days for suture removal.     Use protective sunscreen on the laceration to minimize scarring. The scar should continue to become more subtle over the course of the next year.

## 2019-10-20 NOTE — ED TRIAGE NOTES
Pt presents ambulatory to triage. Pt states he was fixing a broken table last night around midnight when he cut his right pinky finger on the metal table. Pt has laceration on inner finger. Pt cleaned wound and stopped the bleeding with a gauze wrap. Bleeding controlled. Pt is wondering if he needs stiches. VSS in triage. Pt is unsure of immunization hx.

## 2019-10-20 NOTE — ED PROVIDER NOTES
History     Chief Complaint   Patient presents with     Laceration     HPI  Lucas Blank is an otherwise healthy 24 year old male who presents for evaluation of a right small finger laceration. The patient reports he was fixing a broken table last night around midnight when he pinched and sliced a part of his right pinky finger on a metal piece of the table. He denies cutting his finger on any glass. The patient is able to bend the finger and is not worried about a fracture. Denies crush injury. He is currently a student and is right hand dominant. H    No past medical history on file.    No past surgical history on file.    No family history on file.    Social History     Tobacco Use     Smoking status: Never Smoker     Smokeless tobacco: Current User   Substance Use Topics     Alcohol use: Yes     Comment: socially       No current facility-administered medications for this encounter.      Current Outpatient Medications   Medication     cephALEXin (KEFLEX) 500 MG capsule      No Known Allergies    I have reviewed the Medications, Allergies, Past Medical and Surgical History, and Social History in the Epic system.    Review of Systems   Musculoskeletal: Negative for arthralgias.   Skin: Positive for wound (laceration on right pinky).   Neurological: Negative for weakness and numbness.   All other systems reviewed and are negative.      Physical Exam   BP: 133/54  Heart Rate: 71  Temp: 98.1  F (36.7  C)  Resp: 18  SpO2: 98 %      Physical Exam  Vitals signs and nursing note reviewed.   Constitutional:       General: He is not in acute distress.     Appearance: He is well-developed. He is not diaphoretic.   HENT:      Head: Normocephalic and atraumatic.      Nose: Nose normal.   Eyes:      General: No scleral icterus.     Conjunctiva/sclera: Conjunctivae normal.   Neck:      Musculoskeletal: Normal range of motion and neck supple.   Cardiovascular:      Rate and Rhythm: Normal rate.   Pulmonary:      Effort:  Pulmonary effort is normal. No respiratory distress.      Breath sounds: No stridor.   Abdominal:      General: There is no distension.   Musculoskeletal: Normal range of motion.         General: No deformity.      Right hand: Normal.        Hands:       Comments: There is a longitudinally oriented 3 cm laceration/skin avulsion along the volar aspect of the right small finger.  There is protuberant muscle tissue.  No visible foreign body, contamination, tendon injury. There is a small curvilinear superficial skin avulsion more distally as well, on the volar pad of the right small finger. Range of motion is intact at the MCP, PIP and DIP joints.  No proximal hand or wrist tenderness. Sensation and distal perfusion intact.   Skin:     General: Skin is warm and dry.      Coloration: Skin is not jaundiced or pale.      Findings: No rash.   Neurological:      Mental Status: He is alert and oriented to person, place, and time.   Psychiatric:         Behavior: Behavior normal.         Thought Content: Thought content normal.         ED Course        Procedures             Narrative: Procedure: Laceration Repair        LACERATION:  A simple clean 3 cm laceration.      LOCATION:  Right volar small finger      FUNCTION:  Distally sensation, circulation, motor and tendon function are intact.      ANESTHESIA:  Local and Digital block using lidocaine 1% wit epi total of 4 mLs       PREPARATION:  Irrigation with Normal Saline      DEBRIDEMENT:  wound explored, no foreign body found and minimal debridement      CLOSURE:  Wound was closed with One Layer.  Skin closed with 3 x 4.0 Ethylon using 1 horizontal mattress and 2 simple interrupted sutures.             Critical Care time:  none             Labs Ordered and Resulted from Time of ED Arrival Up to the Time of Departure from the ED - No data to display         Assessments & Plan (with Medical Decision Making)   Lucas Blank is an otherwise healthy 24 year old male who presents  for evaluation of a right small finger laceration.    Ddx: finger laceration, tendon injury, delayed presentation for wound closure    No evidence of bony or tendon injury on exam of the right small finger.  The proximal laceration is fairly deep with protuberant muscle.  High risk for infection and poor healing given the location and an area of frequent disruption.  Discussed increased risk for wound infection with delayed presentation.  Irrigated wound extensively.  Provided patient with a 3-day course of Keflex for wound prophylaxis.      Wound probed after ring block performed.  There was no penetration of the tissues deep to the superficial fascial layer of the volar digital muscle.  No foreign bodies. A horizontal mattress suture was utilized to mitigate fairly significant tension on wound margin with evulsed area of dermis. Remainder of wound loosely approximated.      Discussed home monitoring for development of compartment syndrome or decreased distal perfusion.  However, I do not anticipate that patient's wound will continue to swell significantly given the delayed presentation from initial injury.  Counseled pt on close monitoring and follow-up with primary care for wound check and suture removal.     I have reviewed the nursing notes.    I have reviewed the findings, diagnosis, plan and need for follow up with the patient.    Discharge Medication List as of 10/20/2019  6:05 PM      START taking these medications    Details   cephALEXin (KEFLEX) 500 MG capsule Take 1 capsule (500 mg) by mouth 4 times daily for 3 days, Disp-12 capsule, R-0, Local Print             Final diagnoses:   Laceration of right little finger without foreign body without damage to nail, initial encounter   ISid, am serving as a trained medical scribe to document services personally performed by Shayy Cruz MD, based on the provider's statements to me.   IShayy MD, was physically present and have reviewed and  verified the accuracy of this note documented by Sid Hardin.    10/20/2019   [facility], [place], EMERGENCY DEPARTMENT     Shayy Cruz MD  10/20/19 2045

## 2021-08-17 NOTE — ED AVS SNAPSHOT
[facility], [place], Emergency Department  [address] [place] [id number]  Phone:  [phone]                                    Lucas Blank   MRN: [id number]    Department:  [facility], Emergency Department   Date of Visit:  10/20/2019           After Visit Summary Signature Page    I have received my discharge instructions, and my questions have been answered. I have discussed any challenges I see with this plan with the nurse or doctor.    ..........................................................................................................................................  Patient/Patient Representative Signature      ..........................................................................................................................................  Patient Representative Print Name and Relationship to Patient    ..................................................               ................................................  Date                                   Time    ..........................................................................................................................................  Reviewed by Signature/Title    ...................................................              ..............................................  Date                                               Time          22EPIC Rev 08/18        0

## 2024-08-22 ENCOUNTER — HOSPITAL ENCOUNTER (EMERGENCY)
Facility: CLINIC | Age: 29
Discharge: HOME | End: 2024-08-23
Attending: EMERGENCY MEDICINE | Admitting: EMERGENCY MEDICINE

## 2024-08-22 DIAGNOSIS — R45.851 SUICIDAL IDEATION: ICD-10-CM

## 2024-08-22 DIAGNOSIS — F10.229 ACUTE ALCOHOLIC INTOXICATION IN ALCOHOLISM WITH COMPLICATION (H): ICD-10-CM

## 2024-08-22 LAB
ALBUMIN SERPL BCG-MCNC: 5.2 G/DL (ref 3.5–5.2)
ALP SERPL-CCNC: 115 U/L (ref 40–150)
ALT SERPL W P-5'-P-CCNC: 58 U/L (ref 0–70)
ANION GAP SERPL CALCULATED.3IONS-SCNC: 14 MMOL/L (ref 7–15)
AST SERPL W P-5'-P-CCNC: 33 U/L (ref 0–45)
BASOPHILS # BLD AUTO: 0.1 10E3/UL (ref 0–0.2)
BASOPHILS NFR BLD AUTO: 1 %
BILIRUB SERPL-MCNC: 0.2 MG/DL
BUN SERPL-MCNC: 13.7 MG/DL (ref 6–20)
CALCIUM SERPL-MCNC: 9.7 MG/DL (ref 8.8–10.4)
CHLORIDE SERPL-SCNC: 104 MMOL/L (ref 98–107)
CREAT SERPL-MCNC: 1.07 MG/DL (ref 0.67–1.17)
EGFRCR SERPLBLD CKD-EPI 2021: >90 ML/MIN/1.73M2
EOSINOPHIL # BLD AUTO: 0.3 10E3/UL (ref 0–0.7)
EOSINOPHIL NFR BLD AUTO: 3 %
ERYTHROCYTE [DISTWIDTH] IN BLOOD BY AUTOMATED COUNT: 12.6 % (ref 10–15)
ETHANOL SERPL-MCNC: 0.22 G/DL
GLUCOSE SERPL-MCNC: 93 MG/DL (ref 70–99)
HCO3 SERPL-SCNC: 24 MMOL/L (ref 22–29)
HCT VFR BLD AUTO: 47.9 % (ref 40–53)
HGB BLD-MCNC: 16.8 G/DL (ref 13.3–17.7)
IMM GRANULOCYTES # BLD: 0.1 10E3/UL
IMM GRANULOCYTES NFR BLD: 1 %
LYMPHOCYTES # BLD AUTO: 2.7 10E3/UL (ref 0.8–5.3)
LYMPHOCYTES NFR BLD AUTO: 36 %
MCH RBC QN AUTO: 31.1 PG (ref 26.5–33)
MCHC RBC AUTO-ENTMCNC: 35.1 G/DL (ref 31.5–36.5)
MCV RBC AUTO: 89 FL (ref 78–100)
MONOCYTES # BLD AUTO: 0.7 10E3/UL (ref 0–1.3)
MONOCYTES NFR BLD AUTO: 10 %
NEUTROPHILS # BLD AUTO: 3.8 10E3/UL (ref 1.6–8.3)
NEUTROPHILS NFR BLD AUTO: 49 %
NRBC # BLD AUTO: 0 10E3/UL
NRBC BLD AUTO-RTO: 0 /100
PLATELET # BLD AUTO: 307 10E3/UL (ref 150–450)
POTASSIUM SERPL-SCNC: 4 MMOL/L (ref 3.4–5.3)
PROT SERPL-MCNC: 8 G/DL (ref 6.4–8.3)
RBC # BLD AUTO: 5.4 10E6/UL (ref 4.4–5.9)
SODIUM SERPL-SCNC: 142 MMOL/L (ref 135–145)
WBC # BLD AUTO: 7.6 10E3/UL (ref 4–11)

## 2024-08-22 PROCEDURE — 80053 COMPREHEN METABOLIC PANEL: CPT | Performed by: EMERGENCY MEDICINE

## 2024-08-22 PROCEDURE — 250N000013 HC RX MED GY IP 250 OP 250 PS 637: Performed by: EMERGENCY MEDICINE

## 2024-08-22 PROCEDURE — 250N000011 HC RX IP 250 OP 636: Mod: JZ | Performed by: EMERGENCY MEDICINE

## 2024-08-22 PROCEDURE — 36415 COLL VENOUS BLD VENIPUNCTURE: CPT | Performed by: EMERGENCY MEDICINE

## 2024-08-22 PROCEDURE — 99285 EMERGENCY DEPT VISIT HI MDM: CPT | Mod: 25

## 2024-08-22 PROCEDURE — 85025 COMPLETE CBC W/AUTO DIFF WBC: CPT | Performed by: EMERGENCY MEDICINE

## 2024-08-22 PROCEDURE — 96374 THER/PROPH/DIAG INJ IV PUSH: CPT

## 2024-08-22 PROCEDURE — 96361 HYDRATE IV INFUSION ADD-ON: CPT

## 2024-08-22 PROCEDURE — 258N000003 HC RX IP 258 OP 636: Performed by: EMERGENCY MEDICINE

## 2024-08-22 PROCEDURE — 82077 ASSAY SPEC XCP UR&BREATH IA: CPT | Performed by: EMERGENCY MEDICINE

## 2024-08-22 RX ORDER — OLANZAPINE 10 MG/1
5 INJECTION, POWDER, LYOPHILIZED, FOR SOLUTION INTRAMUSCULAR ONCE
Status: COMPLETED | OUTPATIENT
Start: 2024-08-22 | End: 2024-08-22

## 2024-08-22 RX ADMIN — SODIUM CHLORIDE 1000 ML: 9 INJECTION, SOLUTION INTRAVENOUS at 21:15

## 2024-08-22 RX ADMIN — OLANZAPINE 5 MG: 10 INJECTION, POWDER, FOR SOLUTION INTRAMUSCULAR at 21:14

## 2024-08-22 RX ADMIN — NICOTINE POLACRILEX 4 MG: 2 GUM, CHEWING BUCCAL at 21:15

## 2024-08-22 ASSESSMENT — ACTIVITIES OF DAILY LIVING (ADL)
ADLS_ACUITY_SCORE: 35

## 2024-08-23 VITALS
TEMPERATURE: 99.1 F | RESPIRATION RATE: 18 BRPM | DIASTOLIC BLOOD PRESSURE: 60 MMHG | HEART RATE: 72 BPM | OXYGEN SATURATION: 97 % | SYSTOLIC BLOOD PRESSURE: 101 MMHG

## 2024-08-23 PROBLEM — R45.851 SUICIDAL IDEATION: Status: ACTIVE | Noted: 2024-08-23

## 2024-08-23 PROBLEM — F10.929 ALCOHOL INTOXICATION (H): Status: ACTIVE | Noted: 2024-08-23

## 2024-08-23 PROBLEM — F41.9 ANXIETY: Status: ACTIVE | Noted: 2024-08-23

## 2024-08-23 PROCEDURE — 250N000013 HC RX MED GY IP 250 OP 250 PS 637: Performed by: EMERGENCY MEDICINE

## 2024-08-23 RX ORDER — NICOTINE 21 MG/24HR
1 PATCH, TRANSDERMAL 24 HOURS TRANSDERMAL DAILY
Status: DISCONTINUED | OUTPATIENT
Start: 2024-08-23 | End: 2024-08-23

## 2024-08-23 RX ORDER — NICOTINE 21 MG/24HR
1 PATCH, TRANSDERMAL 24 HOURS TRANSDERMAL ONCE
Status: DISCONTINUED | OUTPATIENT
Start: 2024-08-23 | End: 2024-08-23 | Stop reason: HOSPADM

## 2024-08-23 RX ADMIN — NICOTINE 1 PATCH: 21 PATCH, EXTENDED RELEASE TRANSDERMAL at 05:20

## 2024-08-23 RX ADMIN — NICOTINE POLACRILEX 4 MG: 2 GUM, CHEWING BUCCAL at 05:20

## 2024-08-23 ASSESSMENT — ACTIVITIES OF DAILY LIVING (ADL)
ADLS_ACUITY_SCORE: 35

## 2024-08-23 ASSESSMENT — COLUMBIA-SUICIDE SEVERITY RATING SCALE - C-SSRS
ATTEMPT SINCE LAST CONTACT: NO
SUICIDE, SINCE LAST CONTACT: NO
1. SINCE LAST CONTACT, HAVE YOU WISHED YOU WERE DEAD OR WISHED YOU COULD GO TO SLEEP AND NOT WAKE UP?: NO
TOTAL  NUMBER OF INTERRUPTED ATTEMPTS SINCE LAST CONTACT: NO
2. HAVE YOU ACTUALLY HAD ANY THOUGHTS OF KILLING YOURSELF?: NO
6. HAVE YOU EVER DONE ANYTHING, STARTED TO DO ANYTHING, OR PREPARED TO DO ANYTHING TO END YOUR LIFE?: NO
TOTAL  NUMBER OF ABORTED OR SELF INTERRUPTED ATTEMPTS SINCE LAST CONTACT: NO

## 2024-08-23 NOTE — ED NOTES
Assumed care for patient. Patient sleeping on bed with eyes closed, equal and unlabored respirations. Patient repositioning independently

## 2024-08-23 NOTE — ED TRIAGE NOTES
Patient BIBA on a hold from outside his apartment for suicidal thoughts. Per hold and EMS patient lost his job today and has been drinking. Patient then called a friend and made suicidal thoughts. Patient states he has a plan to drown himself, hang him self or shoot himself. For EMS patient .

## 2024-08-23 NOTE — DISCHARGE INSTRUCTIONS
Mental health recommendations      One of our care coordinators will reach out to you after your discharge.  If you have any questions about additional resources please call our coordinators at # [phone]    2.  Please use aftercare plan and already established coping skills and safety planning as needed.     3.  Please call 911 and/or return to the emergency department if her symptoms worsen or your safety becomes compromised.       [address] [place] [address] Triage Open Monday through Friday     Medical and Behavioral Health Triage is a new service at [address] [place] [address] that brings together community-based mental health agencies, [facility] , and [facility]) to address our clients' complex needs. The goal of this program is to reach people that are not already receiving services or that are not already connected to case management who have an urgent concern related to their mental health or substance use disorder.   Care coordinators, peer recovery specialists, and health professionals at [address] [place] [address] will address clients' physical health, mental health, addiction issues - and also the wrap-around services that they need to stay healthy, including housing, health insurance, and other resources.   Triage is in N141 at [address] [place] [address]. It is accessible by police from the parking lot. Everyone else can come through the front door of [address] [place] [address], and follow signs to N141.     Triage hours:9:000 am - 5:00 pm  Triage Phone Number: [phone]  Location: [address] [place] [address]  Contact [facility]  Phone: [phone]  Fax: [phone]  [facility] [place] [address]    [facility]  [facility] is a [facility]-supported facility. We provide ongoing, comprehensive, client-centered treatment. Our treatment promotes whole-person wellness and recovery, self-efficacy, and life enhancement.  Our experienced team of  professionals understands that recovery is a personal process. We work with each client to develop a tailored plan of care. The plan builds on strengths and focuses on person-specific goals. We invite participation of the client's support system and other service providers.  We welcome those who may benefit from our flexible service model. We offer scheduled appointments as well as walk-in visits. We strive to be accessible and responsive to our clients' needs.    Appointments  Call the clinic's  or your therapist at [phone]. They will help determine if an in-person visit is needed and possible.  Hours  Monday, Thursday, Friday: 8 a.m. to 5 p.m.  Tuesday: 9:30 a.m. to 5 p.m.  Wednesday: 8 a.m. to 6 p.m.  Closed on holidays.     Aftercare Plan    If I am feeling unsafe or I am in a crisis, I will:   Contact my established care providers   Call the National Suicide Prevention Lifeline: 907.907.1199   Go to the nearest emergency room   Call 911   Your Replaced by [facility] has a mental health crisis team you can call 24/7: [facility] Adult, [phone]    Warning signs that I or other people might notice when a crisis is developing for me: increased alcohol use, anxiety, stress     Things I am able to do on my own to cope or help me feel better:   -Practice square breathing when I begin to feel anxious - in breath through the nose for the count   of 4 and the first line on the square. Out breath through the mouth for the count of 4 for the second line   of the square. Repeat to complete the square. Repeat the square as many times as needed.    Things that I am able to do with others to cope or help me feel better: -Use community resources, including hotline numbers, [facility] crisis and support meetings    Things I can use or do for distraction: -Distraction skills of: going for walks, watching TV, spending time outside, calling a friend or family member  -Download a meditation zaynab and spend 15-20 minutes per day  "mediating/relaxing. Some apps to   download include: Calm, Headspace and Insight Timer. All 3 of these apps have free version    Changes I can make to support my mental health and wellness:   -Attend scheduled mental health therapy and psychiatric appointments and follow all recommendations  -Maintain a daily schedule/routine  -Practice deep breathing skills  -Abstain from all mood altering chemicals not currently prescribed to me     People in my life that I can ask for help: [place] on Mental Illness (DAMION)  230.321.3254 or 1.888.DAMION.HELPS    Other things that are important when I m in crisis: -Commit to 30 minutes of self care daily - this can be as simple as taking a shower, going for a walk, cooking a meal, read, writing, etc        Crisis Lines  Crisis Text Line  Text 734457  You will be connected with a trained live crisis counselor to provide support.    Por espanol, texto  CARLYLE a 896572 o texto a 442-AYUDAME en WhatsApp    National Hope Line  1.800.SUICIDE [1560258]      Community Resources  Fast Tracker  Linking people to mental health and substance use disorder resources  fasttrackermn.org     [place] Mental Health Warm Line  Peer to peer support  Monday thru Saturday, 12 pm to 10 pm  482.336.5051 or 4.069.901.7651  Text \"Support\" to 76559    [employer] [place] on Mental Illness (DAMION)  138.601.0927 or 1.888.DAMION.HELPS        Mental Health Apps  My3  https://mySafetyWebpp.org/    VirtualHopeBox  https://Seiratherm.org/apps/virtual-hope-box/      Additional Information  Today you were seen by a licensed mental health professional through Triage and Transition services, [facility] (P)  for a crisis assessment in the Emergency Department at [facility].  It is recommended that you follow up with your established providers (psychiatrist, mental health therapist, and/or primary care doctor - as relevant) as soon as possible. Coordinators from P will be calling " you in the next 24-48 hours to ensure that you have the resources you need.  You can also contact [facility] coordinators directly at [phone]. You may have been scheduled for or offered an appointment with a mental health provider. [facility] maintains an extensive network of licensed behavioral health providers to connect patients with the services they need.  We do not charge providers a fee to participate in our referral network.  We match patients with providers based on a patient's specific needs, insurance coverage, and location.  Our first effort will be to refer you to a provider within your care system, and will utilize providers outside your care system as needed.          Substance Use Disorder Direct Access Resources    It is recommended that you abstain from all mood altering chemicals. Please contact the sober support hotline (427-234-4326) as needed; phones are answered 24 hours a day, 7 days a week.    To access substance use treatment you must have a comprehensive assessment completed to begin any treatment program.     If uninsured, please contact your county of residence for eligibility screen to substance use disorder evaluation and treatment:    [place] - [phone]   [facility] [phone]   [facility] [phone]   [facility] [phone]   [facility] [phone]   [facility] [phone]   [facility] [phone]   [place] - [phone]     If you have private insurance, call the customer service number on the back of your insurance card to find an in-network substance abuse use disorder assessment. The ideal provider will be a treatment facility, licensed in the state of [place].     Community BARBARA Evaluations: Clients may call their county for a full list of providers - Availability and services listed belo are subject to change, please call the provider to confirm    [facility]  [phone] [address] [place], [place], [address]  *Please call the above number to schedule a  comprehensive assessment for determination of level of care needs. In person and virtual appointments available Mon-Fri.    [facility], [address] First Floor, [address], [place] [address] (next to the outpatient lab)    Phone: [phone]   Provides bridging services to people with Opiate Use Disorders (OUD) seeking care. This is a front door to Medication Assisted Treatments (MAT), ages 16+  Walk In hours: Monday-Friday 9:00am-3:00pm    [facility]  [phone]  Walk in Assessments: Mon-Friday 7a-1:45p  [address], [place], [address]    [facility] - [facility]  Central Access [phone]  [address] [place], [place], [address]  *by appointment only    [facility]  [phone] (phone consultation available )  Locations in: [place], [facility], and [place], [place]  [place] virtual [facility] [phone] or visit [email or web address]   Also offers LGBTQ programming     [facility]  [phone]  [address]  [place], [place], [address]  *Currently only offered via telehealth - call to set up an appointment    [facility] Mental [facility]  [address] [place] [address], [address]  [facility] Program  For more information to to make a referral call:  [phone]  Walk-in on   9-11 a.m.    [facility]  [phone]  [address] [place], [place], [address]  *available by appointments only    [facility] specific  [phone]  [address] [place], [place], [address]  *available by appointment only    [facility]  [phone] [address] [place], [place], [address]  *walk in assessments available M-F starting at 7 am.    [facility]  [phone]  Locations: [facility], [facility], and [place]  *Walk in assessments availble M-F starting at 8 am -virtual only    [facility]  [phone]  [address] [place] [address]    [facility]  [facility] Locations: [place], [place], [place], [place], [facility], [place], Lisette Simon   [phone]  *available by appointment only    [facility]  [phone]  [address]  [place], [place], [address]    [facility]  [phone]  [address] [place], [address]  *available by appointment    [facility]  [phone]  [address] [place], [place], [address]  *walk ins available M-TH from 9-3    [facility]  MAT program: [phone]  [address] [place], [place], [address]    [place]  [phone]  Same day substance use disorder assessments are available Monday - Friday, via walk-in or by appointment at the [place] location.  [address], [address], [place] [address]     [facility] - adolescent and adult SUDs services  [phone]  Offer services Monday through Friday, as well as evening hours Monday through Thursday. Normally, a first appointment will be scheduled within one week  [email or web address]-treatment  Locations all over [place]    If you are intoxicated, you may be required to detox at a detox facility before starting treatment. The following are detox facilities that you can self present to. All detox facilities are able to help you complete an assessment prior to discharge if you choose:    [facility]: Arrive at a [facility] Emergency Department for immediate medical evaluation    [facility]: [address] [place], [place], [address].         [phone]    [facility]: [address] [place], [place], [address]  [phone]     [facility] ([facility]): [address] [place], [place], [address]  [phone]     [facility]: [address], [place], [place], [address], [phone]         Ways to help cope with  sobriety:    -- Take prescribed medicines as scheduled  -- Keep follow-up appointments  -- Talk to others about your concerns  -- Get regular exercise  -- Practice deep breathing skills  -- Eat a healthy diet  -- Use community resources, including hotline numbers, [facility] crisis and support meetings  -- Stay sober and avoid places/people/things associated with substance use  --Maintain a daily schedule/routine  --Get at least 7-8 hours of sleep per night  --Create a list 10--20 healthy activities that you can do that are enjoyable and do not involve substance use  --Create daily goals (approx. 1-4 goals) per day and work to achieve them throughout the day.       Free Resources:    [facility] ([facility])  [facility] connects people seeking recovery to resources that help foster and sustain long-term recovery. Whether you are seeking resources for treatment, transportation, housing, job training, education, health care or other pathways to recovery, [facility] is a great place to start.  Phone: 752.214.7804. www.minnesotaNanoCellect.Coeurative (Great listing of all types of recovery and non-recovery related resources)    Alcoholics Anonymous  Phone: 8-569-ALCOHOL  Website: HTTP://WWW.AA.ORG/  [facility] [place] (585-261-4919 or http://aaminneaMiserWare.org)  [facility] [place] (780-697-6017 or www.aastpaul.org)     Narcotics Anonymous  Phone: 113.861.2932  Website: www.Exeros.Razz.    [facility] [address], [place], [place],  Phone: [phone]  Drop-in Hours: Monday-Friday 9-11:30 am. By appointment at other times.  Provides: [facility] is a drop-in center on the [place] side of [place] that provides a safe space for individuals who are homeless and have a history of chemical use. Sobriety is not a requirement but drugs and alcohol are not allowed on the property.  Services: Non-clients can access drop-in services such as Recovery and Harm Reduction Groups, referrals to case management, community  activities, shower facilities, and a pool table. Individuals who are homeless and have chemical health needs may be eligible for enrollment into [facility]'s case management program. Clients and  work together to access benefits, treatment, health care, shelter, and external housing resources.

## 2024-08-23 NOTE — ED NOTES
[facility]  ED to EMPATH Checklist:      Goal for EMPATH: Suicidality    Current Behavior: Sad and Cooperative    Safety Concerns: Suicidal, with a plan to    Legal Hold Status: [facility]    Medically Cleared by ED provider: Yes    Patient Therapeutically Searched: Therapeutic search by ED staff (strings, belts, shoes, pockets, electronics, etc.)    Belongings: Sealed and put in locker per unit protocol    Independent Ambulation at Baseline: Yes/No: Yes    Participates in Care/Conversation: Yes/No: Yes    Patient Informed about EMPATH: Yes/No: Yes    DEC: Ordered and completed    Patient Ready to be Transferred to EMPATH? Yes/No: Yes

## 2024-08-23 NOTE — ED PROVIDER NOTES
Emergency Department Note      History of Present Illness     Chief Complaint   Suicidal and Alcohol Intoxication      HPI   Lucas Blank is a 29 year old male with no pertinent past medical history who presents given concerns for alcohol intoxication and suicidal statements.  Per the hold sheet provided by EMS the patient had been drinking heavily this evening after losing his job earlier today.  He had ultimately made a phone call to a friend making suicidal statements over the phone saying that he no longer wanted to live and wanted to hang himself.  There were apparently no attempts at self-harm per EMS report although the patient is heavily intoxicated on arrival here.  He did not receive any medications and route.  The patient is requesting nicotine on arrival.  He denies any active chest pain, shortness of breath, abdominal pain, nausea or vomiting.  He denies feeling actively suicidal on arrival although is intoxicated.  He denies other substance ingestion.  He states he drank approximately 10-12 shots as well as 4 seltzers.    Independent Historian   EMS as detailed above.    Review of External Notes   None    Past Medical History     Medical History and Problem List   No past medical history on file.    Medications   No current outpatient medications on file.      Surgical History   No past surgical history on file.    Physical Exam     Patient Vitals for the past 24 hrs:   BP Temp Temp src Pulse Resp SpO2   08/22/24 2052 -- -- -- 112 -- 97 %   08/22/24 2035 (!) 133/101 99.1  F (37.3  C) Temporal -- 18 --     Physical Exam  General: Intoxicated, appears well-developed and well-nourished. Limited participation in exam.   HEENT:  Head:  Atraumatic  Ears:  External ears are normal  Mouth/Throat:  Oropharynx is without erythema or exudate and mucous membranes are dry.   Eyes:   Conjunctivae normal and EOM are normal. No scleral icterus.    Pupils are equal, round, and reactive to light.   CV:  Tachycardic  rate, regular rhythm, normal heart sounds and radial pulses are 2+ and symmetric.  No murmur.  Resp:  Breath sounds are clear bilaterally    Non-labored, no retractions or accessory muscle use  GI:  Abdomen is soft, no distension, no tenderness. No rebound or guarding.  No CVA tenderness bilaterally  MS:  Normal range of motion. No edema.    Back atraumatic.    No midline cervical, thoracic, or lumbar tenderness  Skin:  Warm and dry.  No rash or lesions noted.  Neuro:   Intoxicated. Moving all extremities.  GCS: 15  Psych: Unable to assess due to intoxication.     Diagnostics     Lab Results   Labs Ordered and Resulted from Time of ED Arrival to Time of ED Departure   ETHYL ALCOHOL LEVEL - Abnormal       Result Value    Alcohol ethyl 0.22 (*)    COMPREHENSIVE METABOLIC PANEL - Normal    Sodium 142      Potassium 4.0      Carbon Dioxide (CO2) 24      Anion Gap 14      Urea Nitrogen 13.7      Creatinine 1.07      GFR Estimate >90      Calcium 9.7      Chloride 104      Glucose 93      Alkaline Phosphatase 115      AST 33      ALT 58      Protein Total 8.0      Albumin 5.2      Bilirubin Total 0.2     CBC WITH PLATELETS AND DIFFERENTIAL    WBC Count 7.6      RBC Count 5.40      Hemoglobin 16.8      Hematocrit 47.9      MCV 89      MCH 31.1      MCHC 35.1      RDW 12.6      Platelet Count 307      % Neutrophils 49      % Lymphocytes 36      % Monocytes 10      % Eosinophils 3      % Basophils 1      % Immature Granulocytes 1      NRBCs per 100 WBC 0      Absolute Neutrophils 3.8      Absolute Lymphocytes 2.7      Absolute Monocytes 0.7      Absolute Eosinophils 0.3      Absolute Basophils 0.1      Absolute Immature Granulocytes 0.1      Absolute NRBCs 0.0         Imaging   No orders to display     Independent Interpretation   None    ED Course      Medications Administered   Medications   nicotine (NICORETTE) gum 4 mg (4 mg Buccal $Given 8/22/24 4921)   sodium chloride 0.9% BOLUS 1,000 mL (1,000 mLs Intravenous $New Bag  8/22/24 2115)   OLANZapine (zyPREXA) IV injection 5 mg (5 mg Intravenous $Given 8/22/24 2114)       Procedures   Procedures     Discussion of Management   None    ED Course        Additional Documentation  None    Medical Decision Making / Diagnosis     CMS Diagnoses: None    MIPS       None    MDM   Lucas Blank is a 29 year old male who presents with acute alcohol intoxication and suicidal ideation.  Patient apparently lost his job earlier today and made suicidal statements to a friend over the phone.  Patient stated his plan was to drown himself, hang himself, or shoot himself.  Here the patient is quite intoxicated.  His initial blood alcohol level was 0.22.  Remainder of laboratory work unremarkable.  No evidence of liver dysfunction.  Patient was given IV fluids to help rehydrate and sober the patient so that he can undergo a mental health evaluation given the suicidal statements made while intoxicated.  Patient was mildly agitated on initial arrival and so was given both nicotine gum as well as a dose of IV Zyprexa.  Patient continues to sober appropriately while under my care but is not yet clinically sober enough to undergo a mental health evaluation.  DEC evaluation is pending at time of signout.  Care signed out to my colleague Dr. Lund.  Patient remains on a health officer hold at this time given the suicidal statements made while intoxicated.    Disposition   Care of the patient was transferred to my colleague Dr. Lund pending clinical sobriety and DEC assessment.     Diagnosis     ICD-10-CM    1. Acute alcoholic intoxication in alcoholism with complication (H)  F10.229       2. Suicidal ideation  R45.851          Discharge Medications   New Prescriptions    No medications on file     MD Ramiro Cain Scott, MD  08/22/24 0016

## 2024-08-23 NOTE — PROGRESS NOTES
"  Triage and Transition Services Extended Care Reassessment     Patient: Lucas goes by \"Lucas,\" uses he/him pronouns  Date of Service: August 23, 2024  Site of Service: [facility] EMERGENCY DEPT                               Patient was seen yes  Mode of Assessment: In person     Reason for Reassessment: other (see comment) decrease in sx    History of Patient's Original Emergency Room Encounter: Pt has been diagnosed with anxiety and prescribed zoloft which he says he does not take.      Presentation Summary: Pt presented with a flat and somewhat depressed affect. Pts mood was congruent with this presentation. Pt was oriented x4 and engaged in assessment. Pt denied any SI at time of assessment and was able to safety plan with writer. Pt endorsed having SI in the past while intoxicated. Pt did not want any referrals at time of assessment but was open to walk/ crisis resources. Pt did not endorse any SIB/HI or AH/VH.    Changes Observed Since Initial Assessment: decrease in presenting symptoms, patient/family request    Therapeutic Interventions Provided: Engaged in safety planning, Engaged in cognitive restructuring/ reframing, looked at common cognitive distortions and challenged negative thoughts., Engaged in guided discovery, explored patient's perspectives and helped expand them through socratic dialogue., Reviewed healthy living that supports positive mental health, including looking at sleep hygiene, regular movement, nutrition, and regular socialization., Provided positive reinforcement for progress towards goals, gains in knowledge, and application of skills previously taught., Worked on relapse prevention planning (review of stressors, early warning signs, written plan to respond to signs, and rehearse plan)., Identified and practiced coping skills.    Current Symptoms: anxious sadness anxious        Mental Status Exam   Affect: Appropriate  Appearance: Appropriate  Attention " Span/Concentration: Attentive  Eye Contact: Variable    Fund of Knowledge: Appropriate   Language /Speech Content: Fluent  Language /Speech Volume: Normal  Language /Speech Rate/Productions: Normal  Recent Memory: Poor  Remote Memory: Intact  Mood: Anxious  Orientation to Person: Yes   Orientation to Place: Yes  Orientation to Time of Day: Yes  Orientation to Date: Yes     Situation (Do they understand why they are here?): Yes  Psychomotor Behavior: Normal  Thought Content: Clear  Thought Form: Intact    Treatment Objective(s) Addressed: safety planning, rapport building, identifying treatment goals, processing feelings, assessing safety, exploring obstacles to safety in the community, identifying additional supports, identifying an appropriate aftercare plan    Patient Response to Interventions: acceptance expressed, verbalizes understanding    Progress Towards Goals:  Patient Reports Symptoms Are: improving  Patient Progress Toward Goals: is making progress  Comment: Pt has been receptive to ED interventions  Next Step to Work Toward Discharge: symptom stabilization  Symptom Stabilization Comment: Pt is denying any SI/SIB/HI or AH/VH    Case Management: Summary of Interaction: none at time of assessment    C-SSRS Since Last Contact:   1. Wish to be Dead (Since Last Contact): No  2. Non-Specific Active Suicidal Thoughts (Since Last Contact): No     Actual Attempt (Since Last Contact): No  Has subject engaged in non-suicidal self-injurious behavior? (Since Last Contact): No  Interrupted Attempts (Since Last Contact): No  Aborted or Self-Interrupted Attempt (Since Last Contact): No  Preparatory Acts or Behavior (Since Last Contact): No  Suicide (Since Last Contact): No     Calculated C-SSRS Risk Score (Since Last Contact): No Risk Indicated    Plan: Final Disposition / Recommended Care Path: discharge  Plan for Care reviewed with assigned Medical Provider: yes  Plan for Care Team Review: provider, RN  Comments: BAL  .22 at admission  Patient and/or validated legal guardian concurs: yes  Clinical Substantiation: At this time IP MH admission is not being recommended due to Pt not endorsing any SI/SIB/HI or AH/VH at time of assessment. Pt was able to fully safety plan with writer. Pts current presentation appears to be chronic in nature and Pts current sx appear to be at Pts baseline. During current assessment Pt does not present with any modifiable risk factors that are likely to be mitigated in a hospital setting. Further, current sx and presentation are unlikely to be changed or alleviated by medication management or IP MH therapeutic interventions during a brief hospital stay. Pt does appear to be at higher risk of death by suicide accidental or intentional due to mental health hx and substance use hx.  At this time IP MH admission does not appear to be the most therapeutically beneficial intervention/ level of care for Pt. Pt appears to be able to use and motivated to engage in supportive mental health/ community resources.    Legal Status: Legal Status at Admission: Voluntary/Patient has signed consent for treatment    Session Status: Time session started: 0820  Time session ended: 0832  Session Duration (minutes): 12 minutes  Session Number: 1  Anticipated number of sessions or this episode of care: 1    Session Start Time: 0820  Session Stop Time: 0832  CPT codes: Non-Billable  Time Spent: 12 minutes      CPT code(s) utilized: Non-Billable    Diagnosis:   Patient Active Problem List   Diagnosis Code    Alcohol intoxication (H24) F10.929    Anxiety F41.9    Suicidal ideation R45.851       Primary Problem This Admission: Active Hospital Problems    Alcohol intoxication (H24)      Anxiety      Suicidal ideation        Florence Rangel, [facility]   Licensed Mental Health Professional (LMHP), [facility]  [phone]

## 2024-08-23 NOTE — ED NOTES
Bed: [facility]  Expected date:   Expected time:   Means of arrival:   Comments:  439 29m SI

## 2024-08-23 NOTE — CONSULTS
"Diagnostic Evaluation Consultation  Crisis Assessment    Patient Name: Lucas Blank  Age:  29 year old  Legal Sex: male  Gender Identity: male  Pronouns:   Race: White  Ethnicity: Data Unavailable  Language: English      Patient was assessed: Virtual: Brandicted   Crisis Assessment Start Date: 08/23/24  Crisis Assessment Start Time: 0400  Crisis Assessment Stop Time: 0420  Patient location: [facility] EMERGENCY DEPT                             [facility]    Referral Data and Chief Complaint  Lucas Blank presents to the ED via EMS. Patient is presenting to the ED for the following concerns: Suicidal ideation, Intoxication.   Factors that make the mental health crisis life threatening or complex are:  Pt presented in ED after making suicidal statements to a friend while intoxicated. Pt stated he was fired from his job and drank more/faster than usual. He admitted \"I said shit I didn't mean\" when asked about making suicidal statements. Pt stated he feels better, denies SI/HI and just wants to go home and sleep in his own bed, then call his family and some friends who are supportive. Collateral from Pt's mother and twin sister contradict Pt's report that this is an isolated incident. Per family, Pt has been \"spiraling for about 2 years\". He has history of anxiety and gets very upset and angry when he cannot control a situation or when he does not know what to do. Pt's sister reported that one of Pt's friends contacted her yesterday saying Pt was talking about hanging or drowning himself. Pt has also made comments to family about  being scared of life and not living past 30. Pt's family noted that Pt makes suidal statements when intoxicated..      Informed Consent and Assessment Methods  Explained the crisis assessment process, including applicable information disclosures and limits to confidentiality, assessed understanding of the process, and obtained consent to proceed with the assessment.  Assessment methods " included conducting a formal interview with patient, review of medical records, collaboration with medical staff, and obtaining relevant collateral information from family and community providers when available.  : done     Patient response to interventions: acceptance expressed  Coping skills were attempted to reduce the crisis:        History of the Crisis   Pt has been diagnosed with anxiety and prescribed zoloft which he says he does not take.    Brief Psychosocial History  Family:  Single, Children no  Support System:  Parent(s), Step parent(s), Friend  Employment Status:  unemployed (fired yesterday for being late to work)  Source of Income:     Financial Environmental Concerns:  unemployed  Current Hobbies:     Barriers in Personal Life:       Significant Clinical History  Current Anxiety Symptoms:  anxious  Current Depression/Trauma:     Current Somatic Symptoms:     Current Psychosis/Thought Disturbance:     Current Eating Symptoms:     Chemical Use History:  Alcohol: Other (comments) (denies driking daily but admits to frequently having several drinks/shots at a time)  Last Use:: 08/22/24  Benzodiazepines: None  Opiates: None  Cocaine: None  Marijuana: None  Other Use: None   Past diagnosis:  Anxiety Disorder  Family history:  No known history of mental health or chemical health concerns  Past treatment:  Psychiatric Medication Management  Details of most recent treatment:  Pt prescribed zoloft but only took it for a couple months  Other relevant history:          Collateral Information  Is there collateral information: Yes     Collateral information name, relationship, phone number:  Sarahi Mom [phone]    What happened today: He lost his job today.  Called his friend to come over.  He got really really drunk and got biligerant.  this has been a concern for us for a long time.    He gets mean and says hateful and terrible things.  Then gets made at himself.    He will then try to run away.  Talks  about how it would be better if he wasn't  here.  Not history of suicide attempts.  He has been struggling for about 2 years.    Sister is concerned that he may act on his thoughts when he is intoxicated.     His family is all in [place].  His friends are getting fed up with his drinking and his behavior.     What is different about patient's functioning: He has taken medication in the past but didn't like it and he has gone to therapy but stopped     Concern about alcohol/drug use:      What do you think the patient needs:      Has patient made comments about wanting to kill themselves/others: yes    If d/c is recommended, can they take part in safety/aftercare planning:  yes    Additional collateral information:  Family lives in [place]     Risk Assessment  [place] Suicide Severity Rating Scale Full Clinical Version:  Suicidal Ideation  Q1 Wish to be Dead (Lifetime): Yes  Q2 Non-Specific Active Suicidal Thoughts (Lifetime): Yes  3. Active Suicidal Ideation with any Methods (Not Plan) Without Intent to Act (Lifetime): Yes  Q4 Active Suicidal Ideation with Some Intent to Act, Without Specific Plan (Lifetime): No  Q5 Active Suicidal Ideation with Specific Plan and Intent (Lifetime): No  Q6 Suicide Behavior (Lifetime): no     Suicidal Behavior (Lifetime)  Actual Attempt (Lifetime): No  Has subject engaged in non-suicidal self-injurious behavior? (Lifetime): No  Interrupted Attempts (Lifetime): No  Aborted or Self-Interrupted Attempt (Lifetime): No  Preparatory Acts or Behavior (Lifetime): No    [place] Suicide Severity Rating Scale Recent:   Suicidal Ideation (Recent)  Q1 Wished to be Dead (Past Month): yes  Q2 Suicidal Thoughts (Past Month): yes  Q3 Suicidal Thought Method: yes  Q4 Suicidal Intent without Specific Plan: no  Q5 Suicide Intent with Specific Plan: no  Level of Risk per Screen: moderate risk          Environmental or Psychosocial Events: work or task failure, unemployment/underemployment, ongoing abuse of  substances  Protective Factors: Protective Factors: strong bond to family unit, community support, or employment    Does the patient have thoughts of harming others? Feels Like Hurting Others: no  Previous Attempt to Hurt Others: no  Is the patient engaging in sexually inappropriate behavior?: no    Is the patient engaging in sexually inappropriate behavior?  no        Mental Status Exam   Affect: Appropriate  Appearance: Appropriate  Attention Span/Concentration: Attentive  Eye Contact: Variable    Fund of Knowledge: Appropriate   Language /Speech Content: Fluent  Language /Speech Volume: Normal  Language /Speech Rate/Productions: Normal  Recent Memory: Poor  Remote Memory: Intact  Mood: Anxious  Orientation to Person: Yes   Orientation to Place: Yes  Orientation to Time of Day: Yes  Orientation to Date: Yes     Situation (Do they understand why they are here?): Yes  Psychomotor Behavior: Normal  Thought Content: Clear  Thought Form: Intact     Mini-Cog Assessment  Number of Words Recalled:    Clock-Drawing Test:     Three Item Recall:    Mini-Cog Total Score:       Medication  Psychotropic medications:   Medication Orders - Psychiatric (From admission, onward)      Start     Dose/Rate Route Frequency Ordered Stop    08/23/24 0520  nicotine (NICODERM CQ) 21 MG/24HR 24 hr patch 1 patch         1 patch  over 24 Hours Transdermal ONCE 08/23/24 0512      08/22/24 2057  nicotine (NICORETTE) gum 4 mg         4 mg Buccal EVERY 1 HOUR PRN 08/22/24 2057               Current Care Team  Patient Care Team:  No Ref-Primary, Physician as PCP - Kinza Potts MD as Referring Physician  Mirtha Brunner PA as Physician Assistant (Physician Assistant)    Diagnosis  Patient Active Problem List   Diagnosis Code    Alcohol intoxication (H24) F10.929    Anxiety F41.9    Suicidal ideation R45.851       Primary Problem This Admission  Active Hospital Problems    Alcohol intoxication (H24)      Anxiety      Suicidal  ideation        Clinical Summary and Substantiation of Recommendations   Pt presented in ED for alcohol intoxication and SI. Collateral suggests this is not an isolated incident as Pt stated. Per family, Pt has been spiraling for about 2 years, stuggling with anxiety (for which he has refused treatment), alcohol abuse and suicidal statements when intoxicated. Pt declined referrals for MH or SA. Pt did agree to go to [facility] for observation. Family members are enroute to [place] from [place] and will  Pt when he discharges                          Patient coping skills attempted to reduce the crisis:       Disposition  Recommended disposition: Observation ([facility])        Reviewed case and recommendations with attending provider. Attending Name: Dr Pepe Lund       Attending concurs with disposition: yes       Patient and/or validated legal guardian concurs with disposition:   yes       Final disposition:  observation    Legal status on admission: Voluntary/Patient has signed consent for treatment    Assessment Details   Total duration spent with the patient: 15 min     CPT code(s) utilized: Non-Billable    DORINDA Del Toro, Psychotherapist  [facility] - Triage & Transition Services  Callback: [phone]

## 2024-08-23 NOTE — ED NOTES
Patient was seen at camera pulling at his IV, When writer got to room, patient had pulled IV out and was bleeding on the floor. When asked why patient did that, patient states I have to pee. Why are you guys keeping me here? When can I leave?

## 2024-08-23 NOTE — ED PROVIDER NOTES
Patient received in signout.  DEC evaluated patient.  Patient is denying thoughts of self-harm at this time.  DEC did speak with family as well to obtain collateral.  They had expressed concern as the patient had not been making comments to them with regards to possible self-harm.  Family is flying in from out of town this morning.  I discussed with the patient going to the [facility] unit to wait for family this morning and possibly see a psychiatrist, but the patient was not interested in this.  Patient will be signed out with the plan to wait for family arrival to discuss safe discharge with family and outpatient resources unless family has further acute concerns.     Pepe Lund MD  08/23/24 0511

## 2024-08-23 NOTE — ED NOTES
Pt father and sister here to bring patient home. Family states that they feel comfortable with discharge, and taking over care of patient.

## 2024-08-23 NOTE — ED NOTES
"Patient woke up and asked what the plan of care was going forward. Educated patient that he needed to complete [facility] assessment before discharge. Patient then became verbally aggressive with staff stating, \"Give me my fucking phone. This place is a [facility]. Im ready to go home\". Patient educated that he can not have his phone at this time and would need to be evaluated before he could leave.   "

## 2024-08-23 NOTE — PLAN OF CARE
Lucas Blank  August 23, 2024  Plan of Care Hand-off Note     Patient Care Path: observation    Plan for Care:   Pt presented in ED for alcohol intoxication and SI. Collateral suggests this is not an isolated incident as Pt stated. Per family, Pt has been spiraling for about 2 years, stuggling with anxiety (for which he has refused treatment), alcohol abuse and suicidal statements when intoxicated. Pt declined referrals for MH or SA. Pt did agree to go to [facility] for observation. Family members are enroute to [place] from [place] and will  Pt when he discharges    Identified Goals and Safety Issues:    Reduce anxiety  Referral to SA/MH treatment  Safety plan    Overview:  Pt has been reluctant to get help. Family and friends are very concerned. Dad and twin sister will arrive in [place] about 915am and will be available to participate in discharge plan and will take Pt home when discharged         Legal Status: Legal Status at Admission: Voluntary/Patient has signed consent for treatment      Updated   Radha and  Rns regarding plan of care.           TRUONG Del ToroSW       
no

## 2024-08-24 ENCOUNTER — TELEPHONE (OUTPATIENT)
Dept: BEHAVIORAL HEALTH | Facility: CLINIC | Age: 29
End: 2024-08-24

## 2024-08-24 NOTE — TELEPHONE ENCOUNTER
Phone number verified in [facility] for patient regarding follow up, left [facility] number if they would like additional assistance.  No further follow-up is needed.

## 2024-08-31 ENCOUNTER — HEALTH MAINTENANCE LETTER (OUTPATIENT)
Age: 29
End: 2024-08-31